# Patient Record
Sex: FEMALE | HISPANIC OR LATINO | URBAN - METROPOLITAN AREA
[De-identification: names, ages, dates, MRNs, and addresses within clinical notes are randomized per-mention and may not be internally consistent; named-entity substitution may affect disease eponyms.]

---

## 2024-09-26 ENCOUNTER — OFFICE VISIT (OUTPATIENT)
Dept: URBAN - METROPOLITAN AREA CLINIC 60 | Facility: CLINIC | Age: 59
End: 2024-09-26

## 2024-09-26 RX ORDER — OMEPRAZOLE 20 MG/1
1 CAPSULE 30 MINUTES BEFORE MORNING MEAL CAPSULE, DELAYED RELEASE ORAL ONCE A DAY
Qty: 30 | Refills: 2 | Status: ACTIVE | COMMUNITY
Start: 2024-06-18

## 2024-09-26 RX ORDER — PANTOPRAZOLE SODIUM 20 MG/1
TAKE ONE TABLET BY MOUTH ONE TIME DAILY TABLET, DELAYED RELEASE ORAL
Qty: 30 UNSPECIFIED | Refills: 0 | Status: ACTIVE | COMMUNITY

## 2024-09-26 RX ORDER — MONTELUKAST SODIUM 10 MG/1
TAKE ONE TABLET BY MOUTH ONE TIME DAILY IN THE EVENING TABLET, COATED ORAL
Qty: 30 UNSPECIFIED | Refills: 0 | Status: ACTIVE | COMMUNITY

## 2024-09-26 RX ORDER — BUDESONIDE AND FORMOTEROL FUMARATE 160; 4.5 UG/1; UG/1
AEROSOL, METERED RESPIRATORY (INHALATION)
Qty: 10.3 GRAM | Status: ACTIVE | COMMUNITY

## 2024-09-26 RX ORDER — AMOXICILLIN 500 MG/1
TABLET, FILM COATED ORAL
Qty: 28 TABLET | Status: ACTIVE | COMMUNITY

## 2024-09-26 RX ORDER — ALBUTEROL SULFATE 90 UG/1
INHALE TWO PUFFS BY MOUTH EVERY 4 TO 6 HOURS AS NEEDED AEROSOL, METERED RESPIRATORY (INHALATION)
Qty: 18 UNSPECIFIED | Refills: 0 | Status: ACTIVE | COMMUNITY

## 2024-09-26 RX ORDER — HYDROXYZINE PAMOATE 25 MG/1
CAPSULE ORAL
Qty: 30 CAPSULE | Status: ACTIVE | COMMUNITY

## 2024-09-26 RX ORDER — IBUPROFEN 800 MG/1
TAKE ONE TABLET BY MOUTH THREE TIMES A DAY AS NEEDED FOR PAIN TABLET, FILM COATED ORAL
Qty: 30 UNSPECIFIED | Refills: 0 | Status: ACTIVE | COMMUNITY

## 2024-09-27 ENCOUNTER — OFFICE VISIT (OUTPATIENT)
Dept: URBAN - METROPOLITAN AREA CLINIC 60 | Facility: CLINIC | Age: 59
End: 2024-09-27
Payer: COMMERCIAL

## 2024-09-27 ENCOUNTER — TELEPHONE ENCOUNTER (OUTPATIENT)
Dept: URBAN - METROPOLITAN AREA CLINIC 63 | Facility: CLINIC | Age: 59
End: 2024-09-27

## 2024-09-27 VITALS
SYSTOLIC BLOOD PRESSURE: 110 MMHG | DIASTOLIC BLOOD PRESSURE: 72 MMHG | HEIGHT: 60 IN | WEIGHT: 235 LBS | BODY MASS INDEX: 46.13 KG/M2 | OXYGEN SATURATION: 92 % | HEART RATE: 72 BPM | RESPIRATION RATE: 20 BRPM | TEMPERATURE: 98 F

## 2024-09-27 DIAGNOSIS — K80.20 CALCULUS OF GALLBLADDER WITHOUT CHOLECYSTITIS WITHOUT OBSTRUCTION: ICD-10-CM

## 2024-09-27 DIAGNOSIS — K29.50 OTHER CHRONIC GASTRITIS WITHOUT HEMORRHAGE: ICD-10-CM

## 2024-09-27 DIAGNOSIS — K44.9 HIATAL HERNIA: ICD-10-CM

## 2024-09-27 DIAGNOSIS — A04.8 H. PYLORI INFECTION: ICD-10-CM

## 2024-09-27 PROBLEM — 428305005: Status: ACTIVE | Noted: 2024-09-27

## 2024-09-27 PROBLEM — 8493009: Status: ACTIVE | Noted: 2024-09-27

## 2024-09-27 PROBLEM — 70342003: Status: ACTIVE | Noted: 2024-09-27

## 2024-09-27 PROCEDURE — 99214 OFFICE O/P EST MOD 30 MIN: CPT | Performed by: NURSE PRACTITIONER

## 2024-09-27 RX ORDER — BUDESONIDE AND FORMOTEROL FUMARATE 160; 4.5 UG/1; UG/1
AEROSOL, METERED RESPIRATORY (INHALATION)
Qty: 10.3 GRAM | Status: ACTIVE | COMMUNITY

## 2024-09-27 RX ORDER — CLARITHROMYCIN 500 MG/1
1 TABLET TABLET, FILM COATED ORAL
Qty: 28 TABLET | Refills: 0 | OUTPATIENT
Start: 2024-09-27 | End: 2024-10-11

## 2024-09-27 RX ORDER — ALBUTEROL SULFATE 90 UG/1
INHALE TWO PUFFS BY MOUTH EVERY 4 TO 6 HOURS AS NEEDED AEROSOL, METERED RESPIRATORY (INHALATION)
Qty: 18 UNSPECIFIED | Refills: 0 | Status: ACTIVE | COMMUNITY

## 2024-09-27 RX ORDER — MONTELUKAST SODIUM 10 MG/1
TAKE ONE TABLET BY MOUTH ONE TIME DAILY IN THE EVENING TABLET, COATED ORAL
Qty: 30 UNSPECIFIED | Refills: 0 | Status: ACTIVE | COMMUNITY

## 2024-09-27 RX ORDER — IBUPROFEN 800 MG/1
TAKE ONE TABLET BY MOUTH THREE TIMES A DAY AS NEEDED FOR PAIN TABLET, FILM COATED ORAL
Qty: 30 UNSPECIFIED | Refills: 0 | Status: ACTIVE | COMMUNITY

## 2024-09-27 RX ORDER — RIFABUTIN 150 MG/1
1 CAPSULE WITH FOOD CAPSULE ORAL
Qty: 20 CAPSULE | Refills: 0 | OUTPATIENT
Start: 2024-09-27

## 2024-09-27 RX ORDER — OMEPRAZOLE 20 MG/1
1 CAPSULE 30 MINUTES BEFORE MORNING MEAL CAPSULE, DELAYED RELEASE ORAL ONCE A DAY
Qty: 30 | OUTPATIENT
Start: 2024-09-27

## 2024-09-27 RX ORDER — PANTOPRAZOLE SODIUM 20 MG/1
TAKE ONE TABLET BY MOUTH ONE TIME DAILY TABLET, DELAYED RELEASE ORAL
Qty: 30 UNSPECIFIED | Refills: 0 | Status: ACTIVE | COMMUNITY

## 2024-09-27 RX ORDER — AMOXICILLIN 500 MG/1
2 CAPSULE CAPSULE ORAL EVERY 12 HOURS
Qty: 56 CAPSULE | Refills: 0 | OUTPATIENT
Start: 2024-09-27 | End: 2024-10-11

## 2024-09-27 NOTE — PHYSICAL EXAM MUSCULOSKELETAL:
Normal gait and station , no tenderness or deformities present. Upper and lower extremities normal. normal...

## 2024-09-27 NOTE — HPI-TODAY'S VISIT:
Patient here today in good general state.  She is complaining having chronic abdominal pain.  The pain is not constant 1 and 2 in scale of 10 in intensity. Patient has medical history of right-sided colon resection done by Dr. Fragoso on 2021 as per her report from positive adenoma polyps.  Last colonoscopy was done 2 years ago, as per his report negative.  She had a recall will be in 5-year. Patient has done an ultrasound and CT abdomen a year ago because of her abdominal pain and both of these studies were negative.  Except for a ventral hernia shows evidence of  bowel containing, and cholelithiasis.  Patient states she was referred in with a surgeon, but she was told that she has to lose weight in order to had hernia repair.  Patient will not have a cholelithiasis due to lack of symptoms.  Today physical exam there is diffuse abdominal pain.  More over the epigastric area, she is also complaining of severe symptoms or reflux.  Diarrhea are on and off negative for blood or mucus in it, negative for fever, Diarrhea workup, resume diet and treatment for gastritis and reflux, EGD.  CT abdomen and pelvis.  We may do repeat colonoscopy upon patient clinical status and studies results.  9/24 Patient here today in good general state.  She is here for her studies result EGD shows evidence of normal esophagus, small hiatal hernia, chronic gastritis, normal examined duodenum.  Biopsy results show evidence of duodenal mucosa with no significant abnormality.  Stomach, antrum body biopsy chronic active Helicobacter gastritis, H. pylori organism identified.  Negative for intestinal metaplasia, dysplasia, malignancy.  Lower third esophageal mucosa with reflux type changes, negative for Rodriguez esophagus, dysplasia, malignancy. Lab evidence of stool studies negative for infection, lactoferrin slightly elevated 16.50, calprotectin elevated 198, pancreatic elastase were normal.  Patient has medical history of right colon resection from a large adenoma polyp of the ileocecal valve and around 3 years ago.  Patient states her last colonoscopy was done 2 years ago.  Patient had no more diarrhea.  Unfortunately I do not have her records I will ask for medical records from [facility].

## 2024-11-19 ENCOUNTER — OFFICE VISIT (OUTPATIENT)
Dept: URBAN - METROPOLITAN AREA CLINIC 60 | Facility: CLINIC | Age: 59
End: 2024-11-19

## 2024-11-20 ENCOUNTER — OFFICE VISIT (OUTPATIENT)
Dept: URBAN - METROPOLITAN AREA CLINIC 60 | Facility: CLINIC | Age: 59
End: 2024-11-20

## 2024-11-20 RX ORDER — RIFABUTIN 150 MG/1
1 CAPSULE WITH FOOD CAPSULE ORAL
Qty: 20 CAPSULE | Refills: 0 | Status: ACTIVE | COMMUNITY
Start: 2024-09-27

## 2024-11-20 RX ORDER — PANTOPRAZOLE SODIUM 20 MG/1
TAKE ONE TABLET BY MOUTH ONE TIME DAILY TABLET, DELAYED RELEASE ORAL
Qty: 30 UNSPECIFIED | Refills: 0 | Status: ACTIVE | COMMUNITY

## 2024-11-20 RX ORDER — BUDESONIDE AND FORMOTEROL FUMARATE 160; 4.5 UG/1; UG/1
AEROSOL, METERED RESPIRATORY (INHALATION)
Qty: 10.3 GRAM | Status: ACTIVE | COMMUNITY

## 2024-11-20 RX ORDER — MONTELUKAST SODIUM 10 MG/1
TAKE ONE TABLET BY MOUTH ONE TIME DAILY IN THE EVENING TABLET, COATED ORAL
Qty: 30 UNSPECIFIED | Refills: 0 | Status: ACTIVE | COMMUNITY

## 2024-11-20 RX ORDER — IBUPROFEN 800 MG/1
TAKE ONE TABLET BY MOUTH THREE TIMES A DAY AS NEEDED FOR PAIN TABLET, FILM COATED ORAL
Qty: 30 UNSPECIFIED | Refills: 0 | Status: ACTIVE | COMMUNITY

## 2024-11-20 RX ORDER — OMEPRAZOLE 20 MG/1
1 CAPSULE 30 MINUTES BEFORE MORNING MEAL CAPSULE, DELAYED RELEASE ORAL ONCE A DAY
Qty: 30 | Status: ACTIVE | COMMUNITY
Start: 2024-09-27

## 2024-11-20 RX ORDER — ALBUTEROL SULFATE 90 UG/1
INHALE TWO PUFFS BY MOUTH EVERY 4 TO 6 HOURS AS NEEDED AEROSOL, METERED RESPIRATORY (INHALATION)
Qty: 18 UNSPECIFIED | Refills: 0 | Status: ACTIVE | COMMUNITY

## 2024-11-26 ENCOUNTER — OFFICE VISIT (OUTPATIENT)
Dept: URBAN - METROPOLITAN AREA CLINIC 60 | Facility: CLINIC | Age: 59
End: 2024-11-26

## 2024-11-26 ENCOUNTER — OFFICE VISIT (OUTPATIENT)
Dept: URBAN - METROPOLITAN AREA CLINIC 60 | Facility: CLINIC | Age: 59
End: 2024-11-26
Payer: COMMERCIAL

## 2024-11-26 VITALS
WEIGHT: 228 LBS | HEIGHT: 60 IN | TEMPERATURE: 97.8 F | BODY MASS INDEX: 44.76 KG/M2 | RESPIRATION RATE: 20 BRPM | SYSTOLIC BLOOD PRESSURE: 122 MMHG | DIASTOLIC BLOOD PRESSURE: 80 MMHG | OXYGEN SATURATION: 97 % | HEART RATE: 96 BPM

## 2024-11-26 DIAGNOSIS — A04.8 H. PYLORI INFECTION: ICD-10-CM

## 2024-11-26 DIAGNOSIS — K80.20 CALCULUS OF GALLBLADDER WITHOUT CHOLECYSTITIS WITHOUT OBSTRUCTION: ICD-10-CM

## 2024-11-26 DIAGNOSIS — K29.50 OTHER CHRONIC GASTRITIS WITHOUT HEMORRHAGE: ICD-10-CM

## 2024-11-26 DIAGNOSIS — Z90.49 S/P RIGHT COLECTOMY: ICD-10-CM

## 2024-11-26 DIAGNOSIS — K44.9 HIATAL HERNIA: ICD-10-CM

## 2024-11-26 DIAGNOSIS — K42.0 UMBILICAL HERNIA WITH OBSTRUCTION, WITHOUT GANGRENE: ICD-10-CM

## 2024-11-26 PROCEDURE — 99214 OFFICE O/P EST MOD 30 MIN: CPT | Performed by: NURSE PRACTITIONER

## 2024-11-26 RX ORDER — PANTOPRAZOLE SODIUM 20 MG/1
TAKE ONE TABLET BY MOUTH ONE TIME DAILY TABLET, DELAYED RELEASE ORAL
Qty: 30 UNSPECIFIED | Refills: 0 | Status: ACTIVE | COMMUNITY

## 2024-11-26 RX ORDER — BUDESONIDE AND FORMOTEROL FUMARATE 160; 4.5 UG/1; UG/1
AEROSOL, METERED RESPIRATORY (INHALATION)
Qty: 10.3 GRAM | Status: ACTIVE | COMMUNITY

## 2024-11-26 RX ORDER — ALBUTEROL SULFATE 90 UG/1
INHALE TWO PUFFS BY MOUTH EVERY 4 TO 6 HOURS AS NEEDED AEROSOL, METERED RESPIRATORY (INHALATION)
Qty: 18 UNSPECIFIED | Refills: 0 | Status: ACTIVE | COMMUNITY

## 2024-11-26 NOTE — HPI-TODAY'S VISIT:
Patient here today in good general state.  She is complaining having chronic abdominal pain.  The pain is not constant 1 and 2 in scale of 10 in intensity. Patient has medical history of right-sided colon resection done by Dr. Fragoso on 2021 as per her report from positive adenoma polyps.  Last colonoscopy was done 2 years ago, as per his report negative.  She had a recall will be in 5-year. Patient has done an ultrasound and CT abdomen a year ago because of her abdominal pain and both of these studies were negative.  Except for a ventral hernia shows evidence of  bowel containing, and cholelithiasis.  Patient states she was referred in with a surgeon, but she was told that she has to lose weight in order to had hernia repair.  Patient will not have a cholelithiasis due to lack of symptoms.  Today physical exam there is diffuse abdominal pain.  More over the epigastric area, she is also complaining of severe symptoms or reflux.  Diarrhea are on and off negative for blood or mucus in it, negative for fever, Diarrhea workup, resume diet and treatment for gastritis and reflux, EGD.  CT abdomen and pelvis.  We may do repeat colonoscopy upon patient clinical status and studies results.  9/24 Patient here today in good general state.  She is here for her studies result EGD shows evidence of normal esophagus, small hiatal hernia, chronic gastritis, normal examined duodenum.  Biopsy results show evidence of duodenal mucosa with no significant abnormality.  Stomach, antrum body biopsy chronic active Helicobacter gastritis, H. pylori organism identified.  Negative for intestinal metaplasia, dysplasia, malignancy.  Lower third esophageal mucosa with reflux type changes, negative for Rodriguez esophagus, dysplasia, malignancy. Lab evidence of stool studies negative for infection, lactoferrin slightly elevated 16.50, calprotectin elevated 198, pancreatic elastase were normal.  Patient has medical history of right colon resection from a large adenoma polyp of the ileocecal valve and around 3 years ago.  Patient states her last colonoscopy was done 2 years ago.  Patient had no more diarrhea.  Unfortunately I do not have her records I will ask for medical records from [facility].  11/24 Patient is here today in good general state, she states she did not finish her treatment for H. pylori because it gave her some kind of reaction with nausea and vomiting, and she knows to go to the emergency room for hydration due to nausea and vomiting. She completed 10 days of treatment, patient also stated her primary doctor put her on different treatment she does not remember the medication names. Patient is here experiencing some symptoms of gastritis and reflux I am going to do urea breath test to rule out persistency of this bacteria.  Patient will continue with diet. Patient has medical history of right colon resection for a large adenoma polyp.  Also has medical history of cholelithiasis and periumbilical hernia.  I am going to refer her with surgery to be evaluated for cholecystectomy and herniorrhaphy.

## 2025-01-22 ENCOUNTER — OFFICE VISIT (OUTPATIENT)
Dept: URBAN - METROPOLITAN AREA CLINIC 60 | Facility: CLINIC | Age: 60
End: 2025-01-22

## 2025-04-09 NOTE — PHYSICAL EXAM CHEST:
No lesions,  no deformities, chest wall non-tender,  no masses present, breathing is unlabored without, normal breath sounds. 
stable